# Patient Record
Sex: FEMALE | Race: WHITE | NOT HISPANIC OR LATINO | Employment: STUDENT | URBAN - METROPOLITAN AREA
[De-identification: names, ages, dates, MRNs, and addresses within clinical notes are randomized per-mention and may not be internally consistent; named-entity substitution may affect disease eponyms.]

---

## 2017-05-19 ENCOUNTER — OFFICE VISIT (OUTPATIENT)
Dept: OBSTETRICS AND GYNECOLOGY | Facility: CLINIC | Age: 22
End: 2017-05-19
Payer: COMMERCIAL

## 2017-05-19 VITALS
BODY MASS INDEX: 22.41 KG/M2 | DIASTOLIC BLOOD PRESSURE: 70 MMHG | WEIGHT: 156.5 LBS | HEIGHT: 70 IN | SYSTOLIC BLOOD PRESSURE: 116 MMHG

## 2017-05-19 DIAGNOSIS — Z01.419 ENCOUNTER FOR GYNECOLOGICAL EXAMINATION: Primary | ICD-10-CM

## 2017-05-19 PROCEDURE — 99385 PREV VISIT NEW AGE 18-39: CPT | Mod: S$GLB,,, | Performed by: OBSTETRICS & GYNECOLOGY

## 2017-05-19 PROCEDURE — 1160F RVW MEDS BY RX/DR IN RCRD: CPT | Mod: S$GLB,,, | Performed by: OBSTETRICS & GYNECOLOGY

## 2017-05-19 PROCEDURE — 88175 CYTOPATH C/V AUTO FLUID REDO: CPT

## 2017-05-19 PROCEDURE — 87591 N.GONORRHOEAE DNA AMP PROB: CPT

## 2017-05-19 PROCEDURE — 99999 PR PBB SHADOW E&M-EST. PATIENT-LVL II: CPT | Mod: PBBFAC,,, | Performed by: OBSTETRICS & GYNECOLOGY

## 2017-05-19 NOTE — PROGRESS NOTES
""Nazanin" is a 21 y.o.  who presents for initiation of birth control    Patient's last menstrual period was 04/15/2017.   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 Has had HPV vaccine completed per pt      Past Medical History:   Diagnosis Date    Anemia        Past Surgical History:   Procedure Laterality Date    boxer's fracture right      none         Current Outpatient Prescriptions   Medication Sig Dispense Refill    diclofenac (VOLTAREN) 50 MG EC tablet Take 1 tablet (50 mg total) by mouth 3 (three) times daily as needed. 14 tablet 0     No current facility-administered medications for this visit.           Review of patient's allergies indicates:   Allergen Reactions    Penicillins        .  Family History   Problem Relation Age of Onset    Iron deficiency Mother     Arthritis Maternal Grandmother     Cancer Neg Hx     Heart disease Neg Hx     Diabetes Neg Hx     Stroke Neg Hx        Social History   Substance Use Topics    Smoking status: Never Smoker    Smokeless tobacco: None    Alcohol use No       /70  Ht 5' 10" (1.778 m)  Wt 71 kg (156 lb 8.4 oz)  LMP 04/15/2017  BMI 22.46 kg/m2    ROS:  GENERAL: No acute complaints.   BREASTS: No lumps, tenderness, discharge.  GI: No nausea, vomiting, melena, hematochezia.  : No dysuria, hematuria.  GYN: No unusual discharge, pain    GEN:  Alert and oriented lady in no acute distress.  HEAD and NECK: Normocephalic. Normal thyroid to inspection and palpation  SKIN: No hirsutism or acne                  ABDOMEN: Flat, soft and non tender. No mass, hepatomegaly, RUQT.   PELVIC:      Vulva: normal genitalia.       Urethra: normal size and location. No lesion, prolapse, or discharge. Non tender.      Vagina: Moist, no unusual " discharge      Cervix: Normal appearance. Free of discharge or lesion.  Pap and gen probe      Uterus: Normal size, retroflexed, non tender. No cervical motion tenderness.           Bladder base is non tender.      Adnexa: No masses, tenderness, or CDS nodularity.      Anus: normal appearing.    IMPRESSION: nl exam, will order nexplanon      COUNSELING:  - Pt counseled on her contraceptive options in order of roughly increasing efficacy:    -Discussed natural family planning if patient motivated and the availability of formal education if desired.  -Barriers such as condoms (the only method that has potentially less risk of some STD's), diaphragms, and spermicidal products. Need for compliance every time for success stressed.  -Progestin only pills - stressed importance of daily and even hourly compliance. Discussed possible advantage over estrogen containing products as less risk of thrombosis issues. Irregular bleeding common.  -Combination pills - including transdermal patch, vaginal ring - very effective if taken as directed. Risk of thrombosis discussed. Advantages of cycle control and decreased dysmenorrhea.  -DepoProvera - very effective, probable amen/oligomenorrhea after several months - concern re osteoporosis with long term use and association w weight gain  -Mirena/Anita - very effective, 5 and 3 yr duration of product, probability of scant or amenorrhea. Importance of checking string monthly. Discussed insertion related issues (bleeding, discomfort, infection, uterine perforation) and possible long term risk of perforation and difficult removal  Paraguard - very effective, 10 yr duration. Expectation of normal menses. Same risk as other IUD's.  Nexplanon - very effective. 3 yr duration. Expectation of normal menses. Risks associated principally with insertion/removal (pain, infection, remote risks of need for removal under anesthesia if placement suboptimal  )  Permanent sterilization via TL     All of  her question were addressed and answered to her satisfaction.  Counseling session took 10 minutes.      PLAN: will order Nexplanon and call when available - if not covered will go w [facility]

## 2017-05-20 LAB
C TRACH DNA SPEC QL NAA+PROBE: NOT DETECTED
N GONORRHOEA DNA SPEC QL NAA+PROBE: NOT DETECTED

## 2017-05-22 ENCOUNTER — TELEPHONE (OUTPATIENT)
Dept: OBSTETRICS AND GYNECOLOGY | Facility: CLINIC | Age: 22
End: 2017-05-22

## 2017-05-25 DIAGNOSIS — Z30.011 ENCOUNTER FOR INITIAL PRESCRIPTION OF CONTRACEPTIVE PILLS: Primary | ICD-10-CM

## 2017-06-05 ENCOUNTER — TELEPHONE (OUTPATIENT)
Dept: OBSTETRICS AND GYNECOLOGY | Facility: CLINIC | Age: 22
End: 2017-06-05

## 2017-06-05 NOTE — TELEPHONE ENCOUNTER
----- Message from Nel Esteban sent at 6/5/2017  8:55 AM CDT -----  Contact: pt  Calling regarding prescription. Pt states nurse would call when it was called into the pharmacy.  [phone]

## 2017-06-27 DIAGNOSIS — Z30.011 ENCOUNTER FOR INITIAL PRESCRIPTION OF CONTRACEPTIVE PILLS: ICD-10-CM

## 2017-06-27 NOTE — TELEPHONE ENCOUNTER
----- Message from Juan Benitez sent at 6/27/2017 10:53 AM CDT -----  Contact: [phone]   Pt would like refills-Thx_AH      .1. What is the name of the medication you are requesting? Birth Control  2. What is the dose? unknown  3. How do you take the medication? Orally, topically, etc? orally  4. How often do you take this medication? 1 daily  5. Do you need a 30 day or 90 day supply? 90  Day supply  6. How many refills are you requesting? 4  7. What is your preferred pharmacy and location of the pharmacy? .    [facility] [address] [place], [place] - [address]  [address]  [place] [address]  Phone: [phone] Fax: [phone]      8. Who can we contact with further questions? Patient- [phone].

## 2017-07-19 ENCOUNTER — TELEPHONE (OUTPATIENT)
Dept: OBSTETRICS AND GYNECOLOGY | Facility: CLINIC | Age: 22
End: 2017-07-19

## 2017-07-19 NOTE — TELEPHONE ENCOUNTER
[facility] has made several attempts to contact patient to complete the enrollment process.  I contacted the patient and gave her the number to [facility] [phone]

## 2018-06-11 ENCOUNTER — OFFICE VISIT (OUTPATIENT)
Dept: INTERNAL MEDICINE | Facility: CLINIC | Age: 23
End: 2018-06-11
Payer: COMMERCIAL

## 2018-06-11 VITALS
TEMPERATURE: 98 F | HEIGHT: 70 IN | SYSTOLIC BLOOD PRESSURE: 108 MMHG | HEART RATE: 103 BPM | DIASTOLIC BLOOD PRESSURE: 80 MMHG | BODY MASS INDEX: 24.46 KG/M2 | WEIGHT: 170.88 LBS

## 2018-06-11 DIAGNOSIS — K42.9 UMBILICAL HERNIA WITHOUT OBSTRUCTION AND WITHOUT GANGRENE: Primary | ICD-10-CM

## 2018-06-11 PROCEDURE — 99999 PR PBB SHADOW E&M-EST. PATIENT-LVL III: CPT | Mod: PBBFAC,,, | Performed by: INTERNAL MEDICINE

## 2018-06-11 PROCEDURE — 99203 OFFICE O/P NEW LOW 30 MIN: CPT | Mod: S$GLB,,, | Performed by: INTERNAL MEDICINE

## 2018-06-11 PROCEDURE — 3008F BODY MASS INDEX DOCD: CPT | Mod: CPTII,S$GLB,, | Performed by: INTERNAL MEDICINE

## 2018-06-11 NOTE — PATIENT INSTRUCTIONS
Hernia (Adult)    A hernia can happen when there is a weakness or defect in the wall of the abdomen or groin. Intestines or nearby tissues may move from their usual location and push through the weakness in the wall. This can cause a hernia (bulge) you may see or feel.  Causes and Risk Factors   A hernia may be present at birth. Or it may be caused by the wear and tear of daily living. Certain factors can make a hernia more likely. These can include:  · Heavy lifting  · Straining, whether from lifting, movement, or constipation  · Chronic cough  · Injury to the abdominal wall  · Excess weight  · Pregnancy  · Prior surgery  · Older age  · Family history of hernia  Symptoms  Symptoms of a hernia may come on suddenly. Or they may appear slowly over time. Some common symptoms include:  · Bulge in the groin area, around the navel, or in the scrotum (the bulge may get bigger when you stand and go away when you lie down)  · Pain or pressure around the bulge  · Pain during activities such as lifting, coughing, or sneezing  · A feeling of weakness or pressure in the groin  · Pain or swelling in the scrotum  Types of hernias  There are different types of hernia. The type you have depends on its location:  · Inguinal: This type is in the groin or scrotum. It is more common in men.  · Femoral: This type is in the groin, upper thigh (where the leg bends), or labia. It is more common in women.  · Ventral: This type is in the abdominal wall.  · Umbilical: This type occurs around the navel (belly button).  · Incisional: This type occurs at the site of a previous surgery.  The condition of the hernia can help determine how urgently it needs to be treated.  · Reducible: It goes back in by itself, or it can be pushed back in.  · Irreducible: It cant be pushed back in.  · Incarcerated/Strangulated: The intestine is trapped (incarcerated). If this happens, you wont be able to push the bulge back in. If the incarcerated hernia isnt  treated, it may become strangulated. This means the area loses blood supply and the tissue may die. This requires emergency surgery! Treatment is needed right away!  In most cases, a hernia will not heal on its own. Surgery is usually needed to repair the defect in the abdominal wall or groin. Youll be told more about surgery, if needed.  If your symptoms are not severe, treatment may sometimes be delayed. In such cases, regular follow-up visits with the provider will be needed. Youll be asked to keep track of your symptoms and to watch for signs of more serious problems. You may also be given guidelines similar to the home care instructions below.  Home Care  To help keep a hernia from getting worse, you may be advised to:  · Avoid heavy lifting and straining as directed.  · Take steps to prevent constipation, such as eating more fiber and drinking more water. This may help reduce straining that can occur when having a bowel movement. Reducing straining may help keep your symptoms from getting worse.  · Maintain a healthy weight or lose excess weight. This can help reduce strain on abdominal muscles and tissues.  · Stop smoking. This can help prevent coughing that may also strain abdominal muscles and tissues.  Follow-up care  Follow up with your healthcare provider, or as directed. If imaging tests were done, they will be reviewed a doctor. You will be told the results and any new findings that may affect your care.  When to seek medical advice  Call your healthcare provider right away if any of these occur:  · Hernia hardens, swells, or grows larger  · Hernia can no longer be pushed back in  · Pain moves to the lower right abdomen (just below the waistline), or spreads to the back  Call 911  Call 911 right away if any of these occur:  · Nausea and vomiting  · Severe pain, redness, or tenderness in the area near the hernia  · Pain worsens quickly and doesnt get better  · Inability to have a bowel movement or  pass gas  · Fever of 100.4°F (38°C) or higher  · Trouble breathing  · Fainting  · Rapid heart rate  · Vomiting blood  · Large amounts of blood in stool  Date Last Reviewed: 6/9/2015  © 4181-1327 [employer]. [address] [place] [address], [place] [address]. All rights reserved. This information is not intended as a substitute for professional medical care. Always follow your healthcare professional's instructions.

## 2018-06-11 NOTE — PROGRESS NOTES
Katerine Biggs Jovany  22 y.o. White female    Chief Complaint   Patient presents with    Hernia     HPI: Presents to the clinic with complaint of a hernia around her umbilicus. She states her father has one in that area and the appearance is similar. She notices a bulge. She is able to push it back in. It is not hurting. She denies constipation, diarrhea, hematochezia or melena.   She does occasionally have discomfort in her left upper abdomen along with nausea. She denies vomiting. She mostly has these symptoms when she eats certain foods.     Past Medical History:   Diagnosis Date    Anemia        Current Outpatient Prescriptions on File Prior to Visit   Medication Sig Dispense Refill    diclofenac (VOLTAREN) 50 MG EC tablet Take 1 tablet (50 mg total) by mouth 3 (three) times daily as needed. 14 tablet 0    norethindrone-ethinyl estradiol (OVCON) 0.4-35 mg-mcg per tablet Take 1 tablet by mouth once daily. 28 tablet 11     Allergies:  Review of patient's allergies indicates:   Allergen Reactions    Penicillins      PHYSICAL EXAM:  VITAL SIGNS: Reviewed  GENERAL: Alert and oriented, no acute distress  ABDOMEN: Soft, non tender; bowel sounds present; small, reducible lower umbilical hernia    ASSESSMENT/PLAN:  Katerine was seen today for hernia.    Diagnoses and all orders for this visit:    Umbilical hernia without obstruction and without gangrene  -     Reassurance  -     Discussed hernias in detail along with red flags  -     Handout provided    RTC as needed.

## 2019-03-01 ENCOUNTER — OFFICE VISIT (OUTPATIENT)
Dept: OBSTETRICS AND GYNECOLOGY | Facility: CLINIC | Age: 24
End: 2019-03-01
Payer: COMMERCIAL

## 2019-03-01 VITALS
HEIGHT: 70 IN | WEIGHT: 161.19 LBS | DIASTOLIC BLOOD PRESSURE: 64 MMHG | SYSTOLIC BLOOD PRESSURE: 102 MMHG | BODY MASS INDEX: 23.07 KG/M2

## 2019-03-01 DIAGNOSIS — Z30.017 ENCOUNTER FOR INITIAL PRESCRIPTION OF IMPLANTABLE SUBDERMAL CONTRACEPTIVE: Primary | ICD-10-CM

## 2019-03-01 PROCEDURE — 99999 PR PBB SHADOW E&M-EST. PATIENT-LVL II: ICD-10-PCS | Mod: PBBFAC,,, | Performed by: NURSE PRACTITIONER

## 2019-03-01 PROCEDURE — 99213 PR OFFICE/OUTPT VISIT, EST, LEVL III, 20-29 MIN: ICD-10-PCS | Mod: S$GLB,,, | Performed by: NURSE PRACTITIONER

## 2019-03-01 PROCEDURE — 99999 PR PBB SHADOW E&M-EST. PATIENT-LVL II: CPT | Mod: PBBFAC,,, | Performed by: NURSE PRACTITIONER

## 2019-03-01 PROCEDURE — 99213 OFFICE O/P EST LOW 20 MIN: CPT | Mod: S$GLB,,, | Performed by: NURSE PRACTITIONER

## 2019-03-01 PROCEDURE — 3008F BODY MASS INDEX DOCD: CPT | Mod: CPTII,S$GLB,, | Performed by: NURSE PRACTITIONER

## 2019-03-01 PROCEDURE — 3008F PR BODY MASS INDEX (BMI) DOCUMENTED: ICD-10-PCS | Mod: CPTII,S$GLB,, | Performed by: NURSE PRACTITIONER

## 2019-03-01 NOTE — PROGRESS NOTES
"CC: Discuss birth control    Katerine Manzo is a 23 y.o. female  presents to discuss birth control. LMP: Patient's last menstrual period was 2019..     Past Medical History:   Diagnosis Date    Anemia      Past Surgical History:   Procedure Laterality Date    boxer's fracture right      none       Social History     Socioeconomic History    Marital status: Single     Spouse name: Not on file    Number of children: Not on file    Years of education: Not on file    Highest education level: Not on file   Social Needs    Financial resource strain: Not on file    Food insecurity - worry: Not on file    Food insecurity - inability: Not on file    Transportation needs - medical: Not on file    Transportation needs - non-medical: Not on file   Occupational History    Not on file   Tobacco Use    Smoking status: Never Smoker    Smokeless tobacco: Never Used   Substance and Sexual Activity    Alcohol use: Yes     Frequency: Monthly or less     Drinks per session: 1 or 2     Binge frequency: Never    Drug use: No    Sexual activity: Yes     Partners: Male   Other Topics Concern    Not on file   Social History Narrative    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     Family History   Problem Relation Age of Onset    Iron deficiency Mother     Arthritis Maternal Grandmother     Cancer Neg Hx     Heart disease Neg Hx     Diabetes Neg Hx     Stroke Neg Hx      OB History      Para Term  AB Living    0 0 0 0 0 0    SAB TAB Ectopic Multiple Live Births    0 0 0 0            /64 (BP Location: Right arm, Patient Position: Sitting, BP Method: Medium (Manual))   Ht 5' 10" (1.778 m)   Wt 73.1 kg (161 lb 2.5 oz)   LMP 2019   BMI 23.12 kg/m²       ROS:  GENERAL: Denies weight gain or weight loss. Feeling well overall.   SKIN: Denies rash or lesions.   HEAD: Denies head injury or headache.   NODES: Denies enlarged lymph nodes.   CHEST: Denies chest pain or " shortness of breath.   CARDIOVASCULAR: Denies palpitations or left sided chest pain.   ABDOMEN: No abdominal pain, constipation, diarrhea, nausea, vomiting or rectal bleeding.   URINARY: No frequency, dysuria, hematuria, or burning on urination.  REPRODUCTIVE: See HPI.   BREASTS: The patient performs breast self-examination and denies pain, lumps, or nipple discharge.   HEMATOLOGIC: No easy bruisability or excessive bleeding.   MUSCULOSKELETAL: Denies joint pain or swelling.   NEUROLOGIC: Denies syncope or weakness.   PSYCHIATRIC: Denies depression, anxiety or mood swings.    PHYSICAL EXAM:  APPEARANCE: Well nourished, well developed, in no acute distress.  AFFECT: WNL, alert and oriented x 3      1. Encounter for initial prescription of implantable subdermal contraceptive     PLAN  Birth control options discussed  Patient wants to proceed with Nexplanon. Paper work signed    Patient was counseled today on A.C.S. Pap guidelines and recommendations for yearly pelvic exams, mammograms and monthly self breast exams; to see her PCP for other health maintenance.

## 2019-03-08 ENCOUNTER — OFFICE VISIT (OUTPATIENT)
Dept: INTERNAL MEDICINE | Facility: CLINIC | Age: 24
End: 2019-03-08
Payer: COMMERCIAL

## 2019-03-08 ENCOUNTER — LAB VISIT (OUTPATIENT)
Dept: LAB | Facility: HOSPITAL | Age: 24
End: 2019-03-08
Attending: FAMILY MEDICINE
Payer: COMMERCIAL

## 2019-03-08 VITALS
TEMPERATURE: 98 F | HEIGHT: 71 IN | HEART RATE: 67 BPM | WEIGHT: 161.38 LBS | SYSTOLIC BLOOD PRESSURE: 108 MMHG | BODY MASS INDEX: 22.59 KG/M2 | OXYGEN SATURATION: 98 % | DIASTOLIC BLOOD PRESSURE: 62 MMHG

## 2019-03-08 DIAGNOSIS — R53.83 OTHER FATIGUE: ICD-10-CM

## 2019-03-08 DIAGNOSIS — Z86.2 HISTORY OF IRON DEFICIENCY ANEMIA: ICD-10-CM

## 2019-03-08 DIAGNOSIS — R53.83 OTHER FATIGUE: Primary | ICD-10-CM

## 2019-03-08 LAB
ALBUMIN SERPL BCP-MCNC: 3.5 G/DL
ALP SERPL-CCNC: 71 U/L
ALT SERPL W/O P-5'-P-CCNC: 15 U/L
ANION GAP SERPL CALC-SCNC: 8 MMOL/L
AST SERPL-CCNC: 18 U/L
BASOPHILS # BLD AUTO: 0.04 K/UL
BASOPHILS NFR BLD: 0.8 %
BILIRUB SERPL-MCNC: 0.3 MG/DL
BUN SERPL-MCNC: 14 MG/DL
CALCIUM SERPL-MCNC: 9.6 MG/DL
CHLORIDE SERPL-SCNC: 106 MMOL/L
CO2 SERPL-SCNC: 26 MMOL/L
CREAT SERPL-MCNC: 0.9 MG/DL
DIFFERENTIAL METHOD: NORMAL
EOSINOPHIL # BLD AUTO: 0.1 K/UL
EOSINOPHIL NFR BLD: 1.4 %
ERYTHROCYTE [DISTWIDTH] IN BLOOD BY AUTOMATED COUNT: 12.2 %
EST. GFR  (AFRICAN AMERICAN): >60 ML/MIN/1.73 M^2
EST. GFR  (NON AFRICAN AMERICAN): >60 ML/MIN/1.73 M^2
FERRITIN SERPL-MCNC: 21 NG/ML
GLUCOSE SERPL-MCNC: 86 MG/DL
HCT VFR BLD AUTO: 41.5 %
HGB BLD-MCNC: 13.3 G/DL
IMM GRANULOCYTES # BLD AUTO: 0.01 K/UL
IMM GRANULOCYTES NFR BLD AUTO: 0.2 %
LYMPHOCYTES # BLD AUTO: 2 K/UL
LYMPHOCYTES NFR BLD: 41.1 %
MCH RBC QN AUTO: 29.6 PG
MCHC RBC AUTO-ENTMCNC: 32 G/DL
MCV RBC AUTO: 92 FL
MONOCYTES # BLD AUTO: 0.4 K/UL
MONOCYTES NFR BLD: 7.9 %
NEUTROPHILS # BLD AUTO: 2.4 K/UL
NEUTROPHILS NFR BLD: 48.6 %
NRBC BLD-RTO: 0 /100 WBC
PLATELET # BLD AUTO: 217 K/UL
PMV BLD AUTO: 10.1 FL
POTASSIUM SERPL-SCNC: 4.7 MMOL/L
PROT SERPL-MCNC: 6.4 G/DL
RBC # BLD AUTO: 4.5 M/UL
SODIUM SERPL-SCNC: 140 MMOL/L
TSH SERPL DL<=0.005 MIU/L-ACNC: 0.71 UIU/ML
WBC # BLD AUTO: 4.96 K/UL

## 2019-03-08 PROCEDURE — 36415 COLL VENOUS BLD VENIPUNCTURE: CPT

## 2019-03-08 PROCEDURE — 99213 OFFICE O/P EST LOW 20 MIN: CPT | Mod: S$GLB,,, | Performed by: FAMILY MEDICINE

## 2019-03-08 PROCEDURE — 82728 ASSAY OF FERRITIN: CPT

## 2019-03-08 PROCEDURE — 84443 ASSAY THYROID STIM HORMONE: CPT

## 2019-03-08 PROCEDURE — 99999 PR PBB SHADOW E&M-EST. PATIENT-LVL III: ICD-10-PCS | Mod: PBBFAC,,, | Performed by: FAMILY MEDICINE

## 2019-03-08 PROCEDURE — 80053 COMPREHEN METABOLIC PANEL: CPT

## 2019-03-08 PROCEDURE — 99213 PR OFFICE/OUTPT VISIT, EST, LEVL III, 20-29 MIN: ICD-10-PCS | Mod: S$GLB,,, | Performed by: FAMILY MEDICINE

## 2019-03-08 PROCEDURE — 85025 COMPLETE CBC W/AUTO DIFF WBC: CPT

## 2019-03-08 PROCEDURE — 99999 PR PBB SHADOW E&M-EST. PATIENT-LVL III: CPT | Mod: PBBFAC,,, | Performed by: FAMILY MEDICINE

## 2019-03-08 PROCEDURE — 3008F BODY MASS INDEX DOCD: CPT | Mod: CPTII,S$GLB,, | Performed by: FAMILY MEDICINE

## 2019-03-08 PROCEDURE — 3008F PR BODY MASS INDEX (BMI) DOCUMENTED: ICD-10-PCS | Mod: CPTII,S$GLB,, | Performed by: FAMILY MEDICINE

## 2019-03-08 NOTE — PROGRESS NOTES
Katerine Biggs Jovany  03/08/2019  [id number]    Primary Doctor No  Patient Care Team:  Primary Doctor No as PCP - General  Has the patient seen any provider outside of the [facility] network since the last visit? (yes). If yes, HIPPA forms completed and records requested.      Chief Complaint:  Chief Complaint   Patient presents with    Establish Care    Fatigue       History of Present Illness:  Patient is a 23-year-old female presents clinic today complaining of fatigue.    Fatigue:  -started about 2-3 years prior  -worsened over the 6 months  -is worried that her thyroid level is low  -hx of iron-deficiency anemia; hasn't had it checked in a long time  -no changes in sleep schedule   -no snoring  -FHx of thyroid problems            History:  Past Medical History:   Diagnosis Date    Anemia      Past Surgical History:   Procedure Laterality Date    boxer's fracture right      none       Family History   Problem Relation Age of Onset    Iron deficiency Mother     Arthritis Maternal Grandmother     Cancer Neg Hx     Heart disease Neg Hx     Diabetes Neg Hx     Stroke Neg Hx      Social History     Socioeconomic History    Marital status: Single     Spouse name: Not on file    Number of children: Not on file    Years of education: Not on file    Highest education level: Not on file   Social Needs    Financial resource strain: Not on file    Food insecurity - worry: Not on file    Food insecurity - inability: Not on file    Transportation needs - medical: Not on file    Transportation needs - non-medical: Not on file   Occupational History    Not on file   Tobacco Use    Smoking status: Never Smoker    Smokeless tobacco: Never Used   Substance and Sexual Activity    Alcohol use: Yes     Frequency: Monthly or less     Drinks per session: 1 or 2     Binge frequency: Never    Drug use: No    Sexual activity: Yes     Partners: Male   Other Topics Concern    Not on file   Social History Narrative     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     There is no problem list on file for this patient.    Review of patient's allergies indicates:   Allergen Reactions    Penicillins        The following were reviewed at this visit: active problem list, medication list, allergies, family history, social history, and health maintenance.    Medications:  Current Outpatient Medications on File Prior to Visit   Medication Sig Dispense Refill    norethindrone-ethinyl estradiol (OVCON) 0.4-35 mg-mcg per tablet Take 1 tablet by mouth once daily. 28 tablet 11     No current facility-administered medications on file prior to visit.        Medications have been reviewed and reconciled with patient at this visit.  Barriers to medications present (no)    Adverse reactions to current medications (no)    Over the counter medications reviewed (Yes ), and if needed added to active Medication list at this visit.     Exam:  Wt Readings from Last 3 Encounters:   03/08/19 73.2 kg (161 lb 6 oz)   03/01/19 73.1 kg (161 lb 2.5 oz)   06/11/18 77.5 kg (170 lb 13.7 oz)     Temp Readings from Last 3 Encounters:   03/08/19 97.7 °F (36.5 °C) (Tympanic)   06/11/18 97.9 °F (36.6 °C) (Tympanic)   10/08/15 97.1 °F (36.2 °C)     BP Readings from Last 3 Encounters:   03/08/19 108/62   03/01/19 102/64   06/11/18 108/80     Pulse Readings from Last 3 Encounters:   03/08/19 67   06/11/18 103   10/08/15 64     Body mass index is 22.83 kg/m².      Review of Systems   Constitutional: Positive for malaise/fatigue. Negative for chills and fever.   Respiratory: Negative for cough and shortness of breath.    Cardiovascular: Negative for chest pain.   Gastrointestinal: Negative for nausea and vomiting.   Musculoskeletal: Negative for joint pain and myalgias.   Neurological: Negative for focal weakness.     Physical Exam   Constitutional: She is oriented to person, place, and time. She appears well-developed and well-nourished. No distress.   HENT:   Head:  Normocephalic and atraumatic.   Eyes: Conjunctivae are normal. No scleral icterus.   Cardiovascular: Normal rate, regular rhythm and normal heart sounds. Exam reveals no gallop and no friction rub.   No murmur heard.  Pulmonary/Chest: Effort normal and breath sounds normal. No respiratory distress. She has no wheezes. She has no rales.   Neurological: She is alert and oriented to person, place, and time.   Skin: Skin is warm and dry. She is not diaphoretic.   Nursing note and vitals reviewed.      Laboratory Reviewed ({Yes)  Lab Results   Component Value Date    WBC 4.95 02/23/2015    HGB 13.2 02/23/2015    HCT 37.9 02/23/2015     02/23/2015    INR 1.0 02/23/2015       Katerine was seen today for establish care and fatigue.    Diagnoses and all orders for this visit:    Other fatigue  -     CBC auto differential; Future  -     Comprehensive metabolic panel; Future  -     TSH; Future  -     Ferritin; Future    History of iron deficiency anemia  -     CBC auto differential; Future  -     Comprehensive metabolic panel; Future  -     TSH; Future  -     Ferritin; Future      -Patient's lab results were reviewed  -Treatment options and alternatives were discussed with the patient. Patient expressed understanding. Patient was given the opportunity to ask questions and be an active participant in their medical care. Patient had no further questions or concerns at this time.   -Patient is an overall moderate risk for health complications from their medical conditions.     Follow up: Follow-up in about 4 weeks (around 4/5/2019), or if symptoms worsen or fail to improve.    After visit summary was printed and given to patient upon discharge today.  Patient goals and care plan are included in After Visit Summary.

## 2019-03-25 ENCOUNTER — PATIENT MESSAGE (OUTPATIENT)
Dept: OBSTETRICS AND GYNECOLOGY | Facility: CLINIC | Age: 24
End: 2019-03-25

## 2019-03-26 RX ORDER — NORGESTIMATE AND ETHINYL ESTRADIOL 0.25-0.035
1 KIT ORAL DAILY
Qty: 28 TABLET | Refills: 11 | Status: SHIPPED | OUTPATIENT
Start: 2019-03-26 | End: 2022-01-19

## 2019-06-05 ENCOUNTER — PATIENT MESSAGE (OUTPATIENT)
Dept: OBSTETRICS AND GYNECOLOGY | Facility: CLINIC | Age: 24
End: 2019-06-05

## 2019-07-22 ENCOUNTER — PATIENT MESSAGE (OUTPATIENT)
Dept: OBSTETRICS AND GYNECOLOGY | Facility: CLINIC | Age: 24
End: 2019-07-22

## 2019-08-07 ENCOUNTER — PATIENT MESSAGE (OUTPATIENT)
Dept: OBSTETRICS AND GYNECOLOGY | Facility: CLINIC | Age: 24
End: 2019-08-07

## 2019-08-09 ENCOUNTER — TELEPHONE (OUTPATIENT)
Dept: OBSTETRICS AND GYNECOLOGY | Facility: CLINIC | Age: 24
End: 2019-08-09

## 2019-08-09 NOTE — TELEPHONE ENCOUNTER
Spoke with patient, patient was informed to contact the office per Aleyda to discuss switching to the Paragard. Aleyda spoke with the patient, and I informed her that she will need to come into the clinic to sign the order form for Paragard. Patient stated she will come in on Monday.

## 2019-08-09 NOTE — TELEPHONE ENCOUNTER
----- Message from Brook Pantoja sent at 8/9/2019 11:11 AM CDT -----  Contact: pt   Type:  Patient Returning Call    Who Called: pt   Who Left Message for Patient: [facility] office   Does the patient know what this is regarding?:birth control  Would the patient rather a call back or a response via [facility]? Call back   Best Call Back Number: [phone]  Additional Information:

## 2019-08-09 NOTE — TELEPHONE ENCOUNTER
Pt is currently on ocp but would like paragard. Pt has not had a consultation with you in regards to this IUD. Would you like to call her to discuss risks/benefits or should she come in for a visit.

## 2022-01-19 ENCOUNTER — TELEPHONE (OUTPATIENT)
Dept: OBSTETRICS AND GYNECOLOGY | Facility: CLINIC | Age: 27
End: 2022-01-19
Payer: COMMERCIAL

## 2022-01-19 ENCOUNTER — OFFICE VISIT (OUTPATIENT)
Dept: OBSTETRICS AND GYNECOLOGY | Facility: CLINIC | Age: 27
End: 2022-01-19
Payer: COMMERCIAL

## 2022-01-19 VITALS
BODY MASS INDEX: 22.23 KG/M2 | SYSTOLIC BLOOD PRESSURE: 110 MMHG | HEIGHT: 71 IN | DIASTOLIC BLOOD PRESSURE: 84 MMHG | WEIGHT: 158.75 LBS

## 2022-01-19 DIAGNOSIS — Z12.4 PAPANICOLAOU SMEAR FOR CERVICAL CANCER SCREENING: ICD-10-CM

## 2022-01-19 DIAGNOSIS — Z01.419 ROUTINE GYNECOLOGICAL EXAMINATION: Primary | ICD-10-CM

## 2022-01-19 PROCEDURE — 3079F PR MOST RECENT DIASTOLIC BLOOD PRESSURE 80-89 MM HG: ICD-10-PCS | Mod: CPTII,S$GLB,, | Performed by: NURSE PRACTITIONER

## 2022-01-19 PROCEDURE — 3074F PR MOST RECENT SYSTOLIC BLOOD PRESSURE < 130 MM HG: ICD-10-PCS | Mod: CPTII,S$GLB,, | Performed by: NURSE PRACTITIONER

## 2022-01-19 PROCEDURE — 3008F BODY MASS INDEX DOCD: CPT | Mod: CPTII,S$GLB,, | Performed by: NURSE PRACTITIONER

## 2022-01-19 PROCEDURE — 99999 PR PBB SHADOW E&M-EST. PATIENT-LVL III: CPT | Mod: PBBFAC,,, | Performed by: NURSE PRACTITIONER

## 2022-01-19 PROCEDURE — 1159F MED LIST DOCD IN RCRD: CPT | Mod: CPTII,S$GLB,, | Performed by: NURSE PRACTITIONER

## 2022-01-19 PROCEDURE — 3079F DIAST BP 80-89 MM HG: CPT | Mod: CPTII,S$GLB,, | Performed by: NURSE PRACTITIONER

## 2022-01-19 PROCEDURE — 99999 PR PBB SHADOW E&M-EST. PATIENT-LVL III: ICD-10-PCS | Mod: PBBFAC,,, | Performed by: NURSE PRACTITIONER

## 2022-01-19 PROCEDURE — 3074F SYST BP LT 130 MM HG: CPT | Mod: CPTII,S$GLB,, | Performed by: NURSE PRACTITIONER

## 2022-01-19 PROCEDURE — 1159F PR MEDICATION LIST DOCUMENTED IN MEDICAL RECORD: ICD-10-PCS | Mod: CPTII,S$GLB,, | Performed by: NURSE PRACTITIONER

## 2022-01-19 PROCEDURE — 1160F RVW MEDS BY RX/DR IN RCRD: CPT | Mod: CPTII,S$GLB,, | Performed by: NURSE PRACTITIONER

## 2022-01-19 PROCEDURE — 99395 PR PREVENTIVE VISIT,EST,18-39: ICD-10-PCS | Mod: S$GLB,,, | Performed by: NURSE PRACTITIONER

## 2022-01-19 PROCEDURE — 3008F PR BODY MASS INDEX (BMI) DOCUMENTED: ICD-10-PCS | Mod: CPTII,S$GLB,, | Performed by: NURSE PRACTITIONER

## 2022-01-19 PROCEDURE — 88175 CYTOPATH C/V AUTO FLUID REDO: CPT | Performed by: NURSE PRACTITIONER

## 2022-01-19 PROCEDURE — 99395 PREV VISIT EST AGE 18-39: CPT | Mod: S$GLB,,, | Performed by: NURSE PRACTITIONER

## 2022-01-19 PROCEDURE — 1160F PR REVIEW ALL MEDS BY PRESCRIBER/CLIN PHARMACIST DOCUMENTED: ICD-10-PCS | Mod: CPTII,S$GLB,, | Performed by: NURSE PRACTITIONER

## 2022-01-19 RX ORDER — METHYLPHENIDATE HYDROCHLORIDE 36 MG/1
72 TABLET ORAL DAILY
COMMUNITY
Start: 2021-12-22

## 2022-01-19 NOTE — TELEPHONE ENCOUNTER
----- Message from Alberto Krueger sent at 1/19/2022  7:27 AM CST -----  Contact: DEEPTHI BARBOZA [[id number]]  .Type:  Needs Medical Advice    Who Called:DEEPTHI BARBOZA [[id number]]  Symptoms (please be specific):   How long has patient had these symptoms:   Pharmacy name and phone #:   Would the patient rather a call back or a response via [facility]?   Both    Best Call Back Number:   [phone]  Additional Information:  pt will  be about  5-10  mins late for her appt please

## 2022-01-19 NOTE — PROGRESS NOTES
"CC: Well woman exam    HPI  Katerine Manzo is a 26 y.o. female  presents for a well woman exam.  LMP: Patient's last menstrual period was 2022..  No issues, problems, or complaints.    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     Family History   Problem Relation Age of Onset    Iron deficiency Mother     Arthritis Maternal Grandmother     Cancer Neg Hx     Heart disease Neg Hx     Diabetes Neg Hx     Stroke Neg Hx      OB History        0    Para   0    Term   0       0    AB   0    Living   0       SAB   0    IAB   0    Ectopic   0    Multiple   0    Live Births                     Current Outpatient Medications:     methylphenidate HCl 36 MG CR tablet, Take 72 mg by mouth once daily., Disp: , Rfl:     norethindrone-ethinyl estradiol (OVCON) 0.4-35 mg-mcg per tablet, Take 1 tablet by mouth once daily., Disp: 28 tablet, Rfl: 11    norgestimate-ethinyl estradiol (ORTHO-CYCLEN) 0.25-35 mg-mcg per tablet, Take 1 tablet by mouth once daily., Disp: 28 tablet, Rfl: 11    GYNECOLOGY HISTORY:  Seeking pregnancy   Declines std screening     DATA REVIEWED:  Last pap:  Normal     /84   Ht 5' 10.5" (1.791 m)   Wt 72 kg (158 lb 11.7 oz)   LMP 2022   BMI 22.45 kg/m²     Review of Systems    OBGyn Exam      There are no diagnoses linked to this encounter.    Patient was counseled today on A.C.S. Pap guidelines (Q3) and recommendations for yearly pelvic exams, " yearly mammograms starting age 40, and clinical breast exams; to see her PCP for other health maintenance.
